# Patient Record
(demographics unavailable — no encounter records)

---

## 2025-04-29 NOTE — PHYSICAL EXAM
[No Acute Distress] : no acute distress [Well-Appearing] : well-appearing [Normal Sclera/Conjunctiva] : normal sclera/conjunctiva [EOMI] : extraocular movements intact [Normal Outer Ear/Nose] : the outer ears and nose were normal in appearance [Normal Oropharynx] : the oropharynx was normal [No Lymphadenopathy] : no lymphadenopathy [Supple] : supple [Thyroid Normal, No Nodules] : the thyroid was normal and there were no nodules present [No Respiratory Distress] : no respiratory distress  [Clear to Auscultation] : lungs were clear to auscultation bilaterally [Normal Rate] : normal rate  [Regular Rhythm] : with a regular rhythm [Normal S1, S2] : normal S1 and S2 [No Murmur] : no murmur heard [Soft] : abdomen soft [Non Tender] : non-tender [Non-distended] : non-distended [No Joint Swelling] : no joint swelling [No Rash] : no rash [Coordination Grossly Intact] : coordination grossly intact [No Focal Deficits] : no focal deficits [Normal Gait] : normal gait [Normal Affect] : the affect was normal [Normal Insight/Judgement] : insight and judgment were intact

## 2025-04-29 NOTE — HEALTH RISK ASSESSMENT
[Good] : ~his/her~ current health as good [Fair] :  ~his/her~ mood as fair [3] : 1) Little interest or pleasure doing things for nearly every day (3) [PHQ-2 Positive] : PHQ-2 Positive [Not at All (0)] : 9.) Thoughts that you would be off dead or of hurting yourself in some way? Not at all [Moderately Severe] : Severity of Depression is Moderately Severe [Former] : Former [10-14] : 10-14 [With Family] : lives with family [Employed] : employed [] :  [# Of Children ___] : has [unfilled] children [Yes] : Yes [2 - 4 times a month (2 pts)] : 2-4 times a month (2 points) [1 or 2 (0 pts)] : 1 or 2 (0 points) [Never (0 pts)] : Never (0 points) [> 15 Years] : > 15 Years [Smoke Detector] : smoke detector [2] : 2) Feeling down, depressed, or hopeless for more than half of the days (2) [1/2 of Days or More (2)] : 2.) Feeling down, depressed or hopeless? Half the days or more [Nearly Every Day (3)] : 5.) Poor appetite or overeating? Nearly every day [Several Days (1)] : 7.) Trouble concentrating on things, such as reading a newspaper or watching television? Several days [I have developed a follow-up plan documented below in the note.] : I have developed a follow-up plan documented below in the note. [Audit-CScore] : 2 [de-identified] : marijuana occasionally  [de-identified] : not exercising, active at work  [de-identified] : eats one meal a day [[id number]] : 5 [[id number]] : 17 [de-identified] : quit in 2012, smoked 1 pack per day for 10 years [Reports changes in hearing] : Reports no changes in hearing [Reports changes in vision] : Reports no changes in vision [Reports changes in dental health] : Reports no changes in dental health [FreeTextEntry2] :

## 2025-04-29 NOTE — HISTORY OF PRESENT ILLNESS
[FreeTextEntry1] : CPE [de-identified] : 37yo male with no significant PMH who presents to the office for annual physical examination.  He continues to feel fatigued and depressed during the winter months. He reports a lack of interest/apathy. Wife was concerned and wants his bloodwork checked. We did discuss seasonal affective disorder and depression at his last visit but he was not interested in medication. He would like to check a full panel of bloodwork today.   Reports when he eats chips, he can feel burning in his mouth.   Had a vasectomy 4-5 months ago. Reports that the procedure was very painful.

## 2025-04-29 NOTE — REVIEW OF SYSTEMS
[Fatigue] : fatigue [Fever] : no fever [Chills] : no chills [Vision Problems] : no vision problems [Earache] : no earache [Nasal Discharge] : no nasal discharge [Sore Throat] : no sore throat [Chest Pain] : no chest pain [Palpitations] : no palpitations [Shortness Of Breath] : no shortness of breath [Cough] : no cough [Abdominal Pain] : no abdominal pain [Nausea] : no nausea [Constipation] : no constipation [Diarrhea] : no diarrhea [Vomiting] : no vomiting [Dysuria] : no dysuria [Frequency] : no frequency [Joint Pain] : no joint pain [Back Pain] : no back pain [Itching] : no itching [Mole Changes] : no mole changes [Skin Rash] : no skin rash [Headache] : no headache [Dizziness] : no dizziness [Anxiety] : no anxiety [Depression] : no depression

## 2025-04-29 NOTE — ASSESSMENT
[Vaccines Reviewed] : Immunizations reviewed today. Please see immunization details in the vaccine log within the immunization flowsheet.  [FreeTextEntry1] : #HCM - Patient presenting for annual physical exam - Declines flu vaccine today - Will check routine blood work today and call patient with results   #Fatigue/Depression - Again complaining of fatigue and depression, apathy - Reports that he is feeling better now that it is getting warmer, but is dreading next winter already - We had a long discussion regarding this last year, at which time I recommended SSRI medications which he declined  - PHQ-9 = 17, moderately severe depression - Discussed with patient that physical symptoms of fatigue, poor appetite, moving slowly are all likely related to his underlying depression - Will check bloodwork to rule out metabolic cause of fatigue at patient's and patient's wife request - Did discuss SSRIs again with patient, he is slightly more open to this